# Patient Record
Sex: MALE | URBAN - METROPOLITAN AREA
[De-identification: names, ages, dates, MRNs, and addresses within clinical notes are randomized per-mention and may not be internally consistent; named-entity substitution may affect disease eponyms.]

---

## 2024-02-29 ENCOUNTER — APPOINTMENT (RX ONLY)
Dept: URBAN - METROPOLITAN AREA CLINIC 151 | Facility: CLINIC | Age: 59
Setting detail: DERMATOLOGY
End: 2024-02-29

## 2024-02-29 DIAGNOSIS — L82.1 OTHER SEBORRHEIC KERATOSIS: ICD-10-CM

## 2024-02-29 DIAGNOSIS — D22 MELANOCYTIC NEVI: ICD-10-CM

## 2024-02-29 PROBLEM — D23.5 OTHER BENIGN NEOPLASM OF SKIN OF TRUNK: Status: ACTIVE | Noted: 2024-02-29

## 2024-02-29 PROBLEM — D22.4 MELANOCYTIC NEVI OF SCALP AND NECK: Status: ACTIVE | Noted: 2024-02-29

## 2024-02-29 PROCEDURE — ? REFERRAL CORRESPONDENCE

## 2024-02-29 PROCEDURE — 99203 OFFICE O/P NEW LOW 30 MIN: CPT

## 2024-02-29 PROCEDURE — ? BENIGN DESTRUCTION COSMETIC

## 2024-02-29 PROCEDURE — ? DIAGNOSIS COMMENT

## 2024-02-29 PROCEDURE — ? COUNSELING

## 2024-02-29 PROCEDURE — ? OBSERVATION AND MEASURE

## 2024-02-29 ASSESSMENT — LOCATION SIMPLE DESCRIPTION DERM
LOCATION SIMPLE: TRAPEZIAL NECK
LOCATION SIMPLE: CHEST

## 2024-02-29 ASSESSMENT — LOCATION ZONE DERM
LOCATION ZONE: TRUNK
LOCATION ZONE: NECK

## 2024-02-29 ASSESSMENT — LOCATION DETAILED DESCRIPTION DERM
LOCATION DETAILED: LEFT LATERAL SUPERIOR CHEST
LOCATION DETAILED: MID TRAPEZIAL NECK

## 2024-02-29 NOTE — PROCEDURE: DIAGNOSIS COMMENT
Render Risk Assessment In Note?: no
Detail Level: Simple
Comment: Not too many true moles, benign findings on today's exam. Reviewed the ABCDE's of melanoma, given evidence of actinic damage recommended annual FBSE's.

## 2024-02-29 NOTE — HPI: OTHER
Condition:: FBSE
Please Describe Your Condition:: Pt reports he has not had a FBSE performed in the past. Pt states he has concern of an asymptomatic pigmented skin lesion on the L chest.

## 2024-07-22 ENCOUNTER — NEW REFERRAL (OUTPATIENT)
Dept: URBAN - METROPOLITAN AREA CLINIC 101 | Facility: CLINIC | Age: 59
End: 2024-07-22

## 2024-07-22 DIAGNOSIS — H15.012: ICD-10-CM

## 2024-07-22 DIAGNOSIS — H43.393: ICD-10-CM

## 2024-07-22 PROCEDURE — 99204 OFFICE O/P NEW MOD 45 MIN: CPT

## 2024-07-22 PROCEDURE — 92201 OPSCPY EXTND RTA DRAW UNI/BI: CPT

## 2024-07-22 PROCEDURE — 92134 CPTRZ OPH DX IMG PST SGM RTA: CPT | Mod: NC

## 2024-07-22 ASSESSMENT — VISUAL ACUITY
OD_SC: 20/20-1
OS_SC: 20/20

## 2024-07-22 ASSESSMENT — TONOMETRY
OD_IOP_MMHG: 13
OS_IOP_MMHG: 10

## 2025-01-02 ENCOUNTER — PROBLEM (OUTPATIENT)
Dept: URBAN - METROPOLITAN AREA CLINIC 33 | Facility: CLINIC | Age: 60
End: 2025-01-02

## 2025-01-02 DIAGNOSIS — H43.393: ICD-10-CM

## 2025-01-02 DIAGNOSIS — H15.012: ICD-10-CM

## 2025-01-02 PROCEDURE — 92134 CPTRZ OPH DX IMG PST SGM RTA: CPT | Mod: NC

## 2025-01-02 PROCEDURE — 92014 COMPRE OPH EXAM EST PT 1/>: CPT

## 2025-01-02 PROCEDURE — 92201 OPSCPY EXTND RTA DRAW UNI/BI: CPT

## 2025-01-02 ASSESSMENT — VISUAL ACUITY
OS_CC: 20/20-1
OD_CC: 20/25-2

## 2025-01-02 ASSESSMENT — TONOMETRY
OS_IOP_MMHG: 14
OD_IOP_MMHG: 13

## 2025-01-13 ENCOUNTER — FOLLOW UP (OUTPATIENT)
Dept: URBAN - METROPOLITAN AREA CLINIC 33 | Facility: CLINIC | Age: 60
End: 2025-01-13

## 2025-01-13 ASSESSMENT — VISUAL ACUITY
OD_CC: 20/20-3
OS_CC: 20/20

## 2025-01-13 ASSESSMENT — TONOMETRY
OD_IOP_MMHG: 12
OS_IOP_MMHG: 12

## 2025-02-10 ENCOUNTER — FOLLOW UP (OUTPATIENT)
Dept: URBAN - METROPOLITAN AREA CLINIC 33 | Facility: CLINIC | Age: 60
End: 2025-02-10

## 2025-02-10 DIAGNOSIS — H35.373: ICD-10-CM

## 2025-02-10 DIAGNOSIS — H15.013: ICD-10-CM

## 2025-02-10 DIAGNOSIS — H43.823: ICD-10-CM

## 2025-02-10 PROCEDURE — 92014 COMPRE OPH EXAM EST PT 1/>: CPT

## 2025-02-10 PROCEDURE — 92202 OPSCPY EXTND ON/MAC DRAW: CPT

## 2025-02-10 PROCEDURE — 92134 CPTRZ OPH DX IMG PST SGM RTA: CPT

## 2025-02-10 ASSESSMENT — VISUAL ACUITY
OS_CC: 20/20-1
OD_CC: 20/20-3

## 2025-02-10 ASSESSMENT — TONOMETRY
OS_IOP_MMHG: 12
OD_IOP_MMHG: 13

## (undated) RX ORDER — PREDNISONE 20MG 20 MG/1: 1 TABLET ORAL